# Patient Record
Sex: FEMALE | Race: WHITE | URBAN - METROPOLITAN AREA
[De-identification: names, ages, dates, MRNs, and addresses within clinical notes are randomized per-mention and may not be internally consistent; named-entity substitution may affect disease eponyms.]

---

## 2020-09-04 ENCOUNTER — HISTORICAL (OUTPATIENT)
Dept: ADMINISTRATIVE | Facility: HOSPITAL | Age: 35
End: 2020-09-04

## 2020-09-07 LAB — FINAL CULTURE: NO GROWTH

## 2024-01-30 ENCOUNTER — TELEPHONE (OUTPATIENT)
Dept: NEUROLOGY | Facility: CLINIC | Age: 39
End: 2024-01-30

## 2024-01-30 NOTE — TELEPHONE ENCOUNTER
Pt called asking if we accepted new pts and what was  the process of switching from her current neuro dr.

## 2024-08-29 DIAGNOSIS — R51.9 HEADACHE: Primary | ICD-10-CM

## 2025-01-07 ENCOUNTER — OFFICE VISIT (OUTPATIENT)
Dept: NEUROLOGY | Facility: CLINIC | Age: 40
End: 2025-01-07
Payer: COMMERCIAL

## 2025-01-07 VITALS — DIASTOLIC BLOOD PRESSURE: 93 MMHG | WEIGHT: 117 LBS | HEART RATE: 96 BPM | SYSTOLIC BLOOD PRESSURE: 127 MMHG

## 2025-01-07 DIAGNOSIS — M54.81 BILATERAL OCCIPITAL NEURALGIA: ICD-10-CM

## 2025-01-07 DIAGNOSIS — R51.9 HEADACHE: ICD-10-CM

## 2025-01-07 DIAGNOSIS — G43.709 CHRONIC MIGRAINE W/O AURA W/O STATUS MIGRAINOSUS, NOT INTRACTABLE: Primary | ICD-10-CM

## 2025-01-07 DIAGNOSIS — G50.0 TRIGEMINAL NEURALGIA OF RIGHT SIDE OF FACE: ICD-10-CM

## 2025-01-07 PROCEDURE — 3074F SYST BP LT 130 MM HG: CPT | Mod: CPTII,S$GLB,, | Performed by: PSYCHIATRY & NEUROLOGY

## 2025-01-07 PROCEDURE — 64405 NJX AA&/STRD GR OCPL NRV: CPT | Mod: 50,S$GLB,, | Performed by: PSYCHIATRY & NEUROLOGY

## 2025-01-07 PROCEDURE — 3080F DIAST BP >= 90 MM HG: CPT | Mod: CPTII,S$GLB,, | Performed by: PSYCHIATRY & NEUROLOGY

## 2025-01-07 PROCEDURE — 1159F MED LIST DOCD IN RCRD: CPT | Mod: CPTII,S$GLB,, | Performed by: PSYCHIATRY & NEUROLOGY

## 2025-01-07 PROCEDURE — 99204 OFFICE O/P NEW MOD 45 MIN: CPT | Mod: 25,S$GLB,, | Performed by: PSYCHIATRY & NEUROLOGY

## 2025-01-07 PROCEDURE — 99999 PR PBB SHADOW E&M-EST. PATIENT-LVL III: CPT | Mod: PBBFAC,,, | Performed by: PSYCHIATRY & NEUROLOGY

## 2025-01-07 RX ORDER — PROMETHAZINE HYDROCHLORIDE 12.5 MG/1
12.5 TABLET ORAL EVERY 6 HOURS PRN
Qty: 20 TABLET | Refills: 1 | Status: SHIPPED | OUTPATIENT
Start: 2025-01-07

## 2025-01-07 RX ORDER — KETOROLAC TROMETHAMINE 30 MG/ML
30 INJECTION, SOLUTION INTRAMUSCULAR; INTRAVENOUS
Status: COMPLETED | OUTPATIENT
Start: 2025-01-07 | End: 2025-01-10

## 2025-01-07 RX ORDER — DIPHENHYDRAMINE HCL 25 MG
25 CAPSULE ORAL EVERY 6 HOURS PRN
Qty: 20 CAPSULE | Refills: 1 | Status: SHIPPED | OUTPATIENT
Start: 2025-01-07

## 2025-01-07 RX ORDER — DEXTROAMPHETAMINE SACCHARATE, AMPHETAMINE ASPARTATE, DEXTROAMPHETAMINE SULFATE AND AMPHETAMINE SULFATE 5; 5; 5; 5 MG/1; MG/1; MG/1; MG/1
1 TABLET ORAL 2 TIMES DAILY
COMMUNITY
Start: 2024-12-11

## 2025-01-07 RX ORDER — CEFUROXIME AXETIL 250 MG/1
6 TABLET ORAL
COMMUNITY
Start: 2024-12-24

## 2025-01-07 RX ORDER — BUTALBITAL, ACETAMINOPHEN AND CAFFEINE 50; 325; 40 MG/1; MG/1; MG/1
1 TABLET ORAL DAILY PRN
COMMUNITY
Start: 2024-10-14

## 2025-01-07 RX ORDER — OXCARBAZEPINE 150 MG/1
TABLET, FILM COATED ORAL
Qty: 120 TABLET | Refills: 11 | Status: SHIPPED | OUTPATIENT
Start: 2025-01-07

## 2025-01-07 NOTE — PROGRESS NOTES
Chief Complaint   Patient presents with    Headache     NP: Pt referred by Morena Werner NP for neuro consult to evaluate for headaches; Pt states migraines started years ago have become worse of the years pain location typically starts at the back of the head radiates all over the head; vision changes to right eye loss of vision/black dots; nausea/vomiting; light/sound/smell sensitivity         This is a 39 y.o. female  here for  severe migraine.  Exam and history taking is limited as the patient is acutely in pain and crying.  Patient requests that the light be out and she is balled up in the fetal position on the bed.  She does have a history of longstanding migraines and sees Dr. Guevara. She is currently getting Botox injections every 3 months with him.  She is due for Botox today.    Typical headaches are throbbing, worse with routine activity.  She reports that she gets severe headaches similar to  today about once a month.  The current pain she is having is different than her longstanding headaches in that she has tingling in the back of her head which radiates into the left face.  She reports having an MRI 8-10 years ago which was normal.  She takes sumatriptan injectables which sometimes work but sometimes do not.  She has tried several medications for migraines including topiramate, propranolol,  Ajovy, Qulipta.  She reports that they were not effective.        Medication List with Changes/Refills   New Medications    DIPHENHYDRAMINE (BENADRYL) 25 MG CAPSULE    Take 1 capsule (25 mg total) by mouth every 6 (six) hours as needed for Itching (migraine cocktail).    OXCARBAZEPINE (TRILEPTAL) 150 MG TAB    1 tab PO BID for 1 wk, then increase to 2 PO BID thereafter    PROMETHAZINE (PHENERGAN) 12.5 MG TAB    Take 1 tablet (12.5 mg total) by mouth every 6 (six) hours as needed (migraine cocktail).   Current Medications    BUTALBITAL-ACETAMINOPHEN-CAFFEINE -40 MG (FIORICET, ESGIC) -40 MG PER TABLET     Take 1 tablet by mouth daily as needed.    DEXTROAMPHETAMINE-AMPHETAMINE (ADDERALL) 20 MG TABLET    Take 1 tablet by mouth 2 (two) times daily.    SUMATRIPTAN (IMITREX STATDOSE) 6 MG/0.5 ML KIT    Inject 6 mg into the skin every 2 (two) hours as needed.        Past Surgical History:   Procedure Laterality Date    DILATION AND CURETTAGE OF UTERUS          Past Medical History:   Diagnosis Date    Migraines         Family History   Problem Relation Name Age of Onset    Immunodeficiency Mother      Hypertension Father          Social History     Socioeconomic History    Marital status:    Tobacco Use    Smoking status: Never     Passive exposure: Never    Smokeless tobacco: Never   Substance and Sexual Activity    Alcohol use: Not Currently          Review of Systems  Review of Systems   Constitutional: Negative for appetite change.   HENT: Negative for sinus pressure and sore throat.    Eyes: Negative for visual disturbance.   Respiratory: Negative for cough and shortness of breath.    Cardiovascular: Negative for chest pain.   Gastrointestinal: Negative for diarrhea and nausea.   Endocrine: Negative for cold intolerance and heat intolerance.   Genitourinary: Negative for dysuria.   Musculoskeletal: Negative for arthralgias and myalgias.   Skin: Negative for rash.   Allergic/Immunologic: Negative for immunocompromised state.   Neurological:        See HPI   Hematological: Does not bruise/bleed easily.   Psychiatric/Behavioral: Negative for hallucinations.      General: alert and oriented, no acute distress, no audible wheezes, pulse intact, no edema    Vitals:    01/07/25 0955   BP: (!) 127/93   Pulse: 96        NAD  Alert and oriented  Cognition and perception intact  No aphasia  EOMI  No facial asymmetry  No dysarthria  Moves all extremities symmetrically  No gross coordination abnormalities  Gait normal     Jeanette was seen today for headache.    Diagnoses and all orders for this visit:    Chronic  migraine w/o aura w/o status migrainosus, not intractable  -     promethazine (PHENERGAN) 12.5 MG Tab; Take 1 tablet (12.5 mg total) by mouth every 6 (six) hours as needed (migraine cocktail).  -     diphenhydrAMINE (BENADRYL) 25 mg capsule; Take 1 capsule (25 mg total) by mouth every 6 (six) hours as needed for Itching (migraine cocktail).    Headache  -     Ambulatory referral/consult to Neurology  -     MRI Brain W WO Contrast; Future    Trigeminal neuralgia of right side of face  -     OXcarbazepine (TRILEPTAL) 150 MG Tab; 1 tab PO BID for 1 wk, then increase to 2 PO BID thereafter    Bilateral occipital neuralgia       Toradol IM given today.  Recommend migraine cocktail at home.  Start Trileptal for possible trigeminal neuralgia.  Occipital nerve block given today    Area cleaned 2 cm to the right and left of inion injection to ERROL with 30 grade 1/2 inch needle. Marcaine 2 cc in the sub-dermis bilaterally. No side effects immediately following and no blood loss. Universal precautions used.

## 2025-01-10 RX ADMIN — KETOROLAC TROMETHAMINE 30 MG: 30 INJECTION, SOLUTION INTRAMUSCULAR; INTRAVENOUS at 10:01

## 2025-02-18 ENCOUNTER — RESULTS FOLLOW-UP (OUTPATIENT)
Dept: NEUROLOGY | Facility: CLINIC | Age: 40
End: 2025-02-18
Payer: COMMERCIAL

## 2025-02-18 NOTE — PROGRESS NOTES
MRI is overall normal, she does have an artery on the right which comes into contact with the right trigeminal nerve but this is a normal variant and would not necessarily cause the symptoms she complained of in the visit. I believe her facial pain was on the left, not the right

## 2025-02-18 NOTE — TELEPHONE ENCOUNTER
----- Message from Nidhi Cantrell MD sent at 2/18/2025  1:55 PM CST -----  MRI is overall normal, she does have an artery on the right which comes into contact with the right trigeminal nerve but this is a normal variant and would not necessarily cause the symptoms she   complained of in the visit. I believe her facial pain was on the left, not the right  ----- Message -----  From: Interface, Rad Results In  Sent: 2/18/2025  10:40 AM CST  To: Nidhi Cantrell MD

## 2025-02-18 NOTE — TELEPHONE ENCOUNTER
Pt informed MRI is overall normal there is an artery on the right which comes into contact with the right trigeminal nerve but this is a normal variant and would not necessarily cause the symptoms she spoke about at her last visit.    Pt states pain is on the right the most

## 2025-02-20 NOTE — PROGRESS NOTES
Ok if most of facial pain is on right the contact of the artery with the trigeminal nerve could cause some of the pain. However, this does not necessarily change our management. Would continue the trileptal that was prescribed at her visit

## 2025-02-20 NOTE — TELEPHONE ENCOUNTER
----- Message from Nidhi Cantrell MD sent at 2/20/2025  4:01 PM CST -----      ----- Message -----  From: Nidhi Cantrell MD  Sent: 2/20/2025   4:01 PM CST  To: April Garrido MA    Ok if most of facial pain is on right the contact of the artery with the trigeminal nerve could cause some of the pain. However, this does not necessarily change our management. Would continue the   trileptal that was prescribed at her visit  ----- Message -----  From: April Garrido MA  Sent: 2/18/2025   4:40 PM CST  To: Nidhi Cantrell MD    ----- Message from April Garrido MA sent at 2/18/2025  4:40 PM CST -----      ----- Message -----  From: Nidhi Cantrell MD  Sent: 2/18/2025   1:55 PM CST  To: Óscar RENO Staff    MRI is overall normal, she does have an artery on the right which comes into contact with the right trigeminal nerve but this is a normal variant and would not necessarily cause the symptoms she   complained of in the visit. I believe her facial pain was on the left, not the right  ----- Message -----  From: Interface, Rad Results In  Sent: 2/18/2025  10:40 AM CST  To: Nidhi Cantrell MD

## 2025-02-21 NOTE — TELEPHONE ENCOUNTER
----- Message from Nidhi Cantrell MD sent at 2/20/2025  4:01 PM CST -----      ----- Message -----  From: Nidhi Cantrell MD  Sent: 2/20/2025   4:01 PM CST  To: April Garrido MA    Ok if most of facial pain is on right the contact of the artery with the trigeminal nerve could cause some of the pain. However, this does not necessarily change our management. Would continue the   trileptal that was prescribed at her visit  ----- Message -----  From: April Garrido MA  Sent: 2/18/2025   4:40 PM CST  To: Nidhi Cantrell MD    ----- Message from April Garrido MA sent at 2/18/2025  4:40 PM CST -----      ----- Message -----  From: Nidhi Cantrell MD  Sent: 2/18/2025   1:55 PM CST  To: Óscar RENO Staff    MRI is overall normal, she does have an artery on the right which comes into contact with the right trigeminal nerve but this is a normal variant and would not necessarily cause the symptoms she   complained of in the visit. I believe her facial pain was on the left, not the right  ----- Message -----  From: Interface, Rad Results In  Sent: 2/18/2025  10:40 AM CST  To: iNdhi Cantrell MD

## 2025-03-10 ENCOUNTER — OFFICE VISIT (OUTPATIENT)
Dept: NEUROLOGY | Facility: CLINIC | Age: 40
End: 2025-03-10
Payer: COMMERCIAL

## 2025-03-10 VITALS
SYSTOLIC BLOOD PRESSURE: 126 MMHG | WEIGHT: 116 LBS | BODY MASS INDEX: 20.55 KG/M2 | HEART RATE: 77 BPM | DIASTOLIC BLOOD PRESSURE: 85 MMHG | HEIGHT: 63 IN

## 2025-03-10 DIAGNOSIS — G43.709 CHRONIC MIGRAINE W/O AURA W/O STATUS MIGRAINOSUS, NOT INTRACTABLE: Primary | ICD-10-CM

## 2025-03-10 PROCEDURE — 3008F BODY MASS INDEX DOCD: CPT | Mod: CPTII,S$GLB,, | Performed by: PSYCHIATRY & NEUROLOGY

## 2025-03-10 PROCEDURE — 3079F DIAST BP 80-89 MM HG: CPT | Mod: CPTII,S$GLB,, | Performed by: PSYCHIATRY & NEUROLOGY

## 2025-03-10 PROCEDURE — 99999 PR PBB SHADOW E&M-EST. PATIENT-LVL III: CPT | Mod: PBBFAC,,, | Performed by: PSYCHIATRY & NEUROLOGY

## 2025-03-10 PROCEDURE — 99214 OFFICE O/P EST MOD 30 MIN: CPT | Mod: S$GLB,,, | Performed by: PSYCHIATRY & NEUROLOGY

## 2025-03-10 PROCEDURE — 3074F SYST BP LT 130 MM HG: CPT | Mod: CPTII,S$GLB,, | Performed by: PSYCHIATRY & NEUROLOGY

## 2025-03-10 PROCEDURE — 1159F MED LIST DOCD IN RCRD: CPT | Mod: CPTII,S$GLB,, | Performed by: PSYCHIATRY & NEUROLOGY

## 2025-03-10 RX ORDER — GALCANEZUMAB 120 MG/ML
120 INJECTION, SOLUTION SUBCUTANEOUS
Qty: 1 ML | Refills: 11 | Status: SHIPPED | OUTPATIENT
Start: 2025-03-10

## 2025-03-10 NOTE — PROGRESS NOTES
Chief Complaint   Patient presents with    Migraine     Pt here for 2 month f/u for migraines. Pt is currently taking oxcarbazepine 300 mg tabs and haven't had a episode since last visit but the migraines haven't completely stopped.        This is a 39 y.o. female here for follow up for migraine.  At last visit was in severe pain. MRI brain was done after last visit and showed ectatic blood vessel contacting right trigeminal nerve,. Started on trileptal at last visit,.   That has helped with facial pain.  Botox continues to be somewhat beneficial but she still has breakthrough headaches several times a week.  She is interested in trying to get on the schedule to do Botox through our clinic.  Currently she is getting it with Dr. Velazquez gall    She has tried several medications for migraines including topiramate, propranolol, aimovig,  Ajovy, Qulipta.  She reports that they were not effective.       Medication List with Changes/Refills   New Medications    GALCANEZUMAB-GNLM (EMGALITY PEN) 120 MG/ML PNIJ    Inject 1 mL (120 mg total) into the skin every 28 days. At start loading dose dispense 2 mL, then 1 mL q 28 days thereafter   Current Medications    BUTALBITAL-ACETAMINOPHEN-CAFFEINE -40 MG (FIORICET, ESGIC) -40 MG PER TABLET    Take 1 tablet by mouth daily as needed.    DEXTROAMPHETAMINE-AMPHETAMINE (ADDERALL) 20 MG TABLET    Take 1 tablet by mouth 2 (two) times daily.    DIPHENHYDRAMINE (BENADRYL) 25 MG CAPSULE    Take 1 capsule (25 mg total) by mouth every 6 (six) hours as needed for Itching (migraine cocktail).    OXCARBAZEPINE (TRILEPTAL) 150 MG TAB    1 tab PO BID for 1 wk, then increase to 2 PO BID thereafter    PROMETHAZINE (PHENERGAN) 12.5 MG TAB    Take 1 tablet (12.5 mg total) by mouth every 6 (six) hours as needed (migraine cocktail).    SUMATRIPTAN (IMITREX STATDOSE) 6 MG/0.5 ML KIT    Inject 6 mg into the skin every 2 (two) hours as needed.        Vitals:    03/10/25 0902   BP: 126/85    Pulse: 77        NAD  Alert and oriented  Cognition and perception intact  No aphasia  EOMI  No facial asymmetry  No dysarthria  Moves all extremities symmetrically  No gross coordination abnormalities  Gait normal     1. Chronic migraine w/o aura w/o status migrainosus, not intractable  -     galcanezumab-gnlm (EMGALITY PEN) 120 mg/mL PnIj; Inject 1 mL (120 mg total) into the skin every 28 days. At start loading dose dispense 2 mL, then 1 mL q 28 days thereafter  Dispense: 1 mL; Refill: 11       Trial of Emgality